# Patient Record
Sex: MALE | Race: WHITE
[De-identification: names, ages, dates, MRNs, and addresses within clinical notes are randomized per-mention and may not be internally consistent; named-entity substitution may affect disease eponyms.]

---

## 2018-02-28 ENCOUNTER — HOSPITAL ENCOUNTER (EMERGENCY)
Dept: HOSPITAL 80 - CED | Age: 43
Discharge: HOME | End: 2018-02-28
Payer: COMMERCIAL

## 2018-02-28 VITALS — DIASTOLIC BLOOD PRESSURE: 132 MMHG | HEART RATE: 92 BPM | RESPIRATION RATE: 20 BRPM | SYSTOLIC BLOOD PRESSURE: 171 MMHG

## 2018-02-28 VITALS — TEMPERATURE: 99 F | OXYGEN SATURATION: 95 %

## 2018-02-28 DIAGNOSIS — J06.9: Primary | ICD-10-CM

## 2018-02-28 NOTE — EDPHY
H & P


Stated Complaint: coughing/hives


Time Seen by Provider: 02/28/18 18:14


HPI/ROS: 





Chief Complaint:  Cough, fevers, chills





HPI:  42-year-old male presenting with 3 days of cough, sore throat, congestion

, fevers, chills and body aches.  Patient states earlier he had a coughing fit 

about a little bit of wheezing.  This lasted about 30 min.  Also developed some 

hives.  These have since resolved.  No nausea or vomiting.  No chest pain or 

shortness of breath.  No abdominal pain.  No diarrhea or constipation.





ROS:  10 point Review of Systems is negative except as noted in the HPI.





PMH:  Denies





Social History: No smoking





Family History: non-contributory





Physical Exam:


Gen: Awake, Alert, No Distress


HEENT:  


     Nose: no rhinorrhea


     Eyes: PERRLA, EOMI


     Mouth: Moist mucosa mild pharyngeal erythema without edema or exudate


Neck: Supple, no JVD


Chest: nontender, lungs clear to auscultation


Heart: S1, S2 normal, no murmur


Abd: Soft, non-tender, no guarding


Back: no CVA tenderness, no midline tenderness 


Ext: no edema, non-tender


Skin: no rash


Neuro: CN II-XII intact, Sensation grossly intact, Strength 5/5 in bilateral 

upper and lower extremities








- Personal History


Current Tetanus/Diphtheria Vaccine: Yes


Current Tetanus Diphtheria and Acellular Pertussis (TDAP): Yes





- Medical/Surgical History


Hx Asthma: No


Hx Chronic Respiratory Disease: No


Hx Diabetes: No


Hx Cardiac Disease: No


Hx Renal Disease: No


Hx Cirrhosis: No


Hx Alcoholism: No


Hx HIV/AIDS: No


Hx Splenectomy or Spleen Trauma: No


Other PMH: high cholesterol, tonsillectomy, appendectomy





- Social History


Smoking Status: Never smoked


Constitutional: 


 Initial Vital Signs











Temperature (C)  37.2 C   02/28/18 18:09


 


Heart Rate  91   02/28/18 18:09


 


Respiratory Rate  22 H  02/28/18 18:09


 


Blood Pressure  163/115 H  02/28/18 18:09


 


O2 Sat (%)  95   02/28/18 18:09








 











O2 Delivery Mode               Room Air














Allergies/Adverse Reactions: 


 





ciprofloxacin [From Cipro] Allergy (Verified 02/28/18 18:13)


 


levofloxacin [From Levaquin] Allergy (Verified 02/28/18 18:13)


 








Home Medications: 














 Medication  Instructions  Recorded


 


Nexium    09/14/13


 


Vytorin    09/14/13


 


Esomeprazole Mag Trihydrate 40 mg PO BID #30 cap.dr 05/22/14





[Nexium]  


 


Hydrocodone/APAP 5/325 [Norco 1 - 2 tab PO Q4PRN PRN #15 tab 05/22/14





5/325 (*)]  


 


Sucralfate [Carafate] 1 gm PO QID #50 tab 05/22/14


 


ZYRTEC  05/22/14


 


Albuterol [Proventil Inhaler HFA 1 - 2 puffs IH Q4H PRN #1 mdi 02/28/18





(*)]  














Medical Decision Making


ED Course/Re-evaluation: 





Patient with viral URI symptoms.  No focal bacterial infection.  Patient is 

declining flu test at this time as he would not want to take Tamiflu evening 

were positive.  He would not be a candidate for Tamiflu as he does not meet CDC 

guidelines and he is over 2 days of symptoms.  Will discharge with follow-up 

with primary care physician, return for worsening.  He does occasionally get 

wheezy with URI symptoms.  Will prescribe him a MDI if he needs it.





Departure





- Departure


Disposition: Home, Routine, Self-Care


Clinical Impression: 


 URI (upper respiratory infection)





Condition: Good


Instructions:  Upper Respiratory Infection (ED)


Additional Instructions: 


You may use the albuterol inhaler 2 puffs every 4 hr as needed for wheeze or 

cough.


Take Tylenol, 1000 mg every 6 hr for fever.


Drink plenty of fluids.


Return to the emergency department for increasing cough, shortness of breath, 

uncontrolled fevers, vomiting, or any other concerns.


Referrals: 


Anabelle Wallace MD [Medical Doctor] - As per Instructions


Prescriptions: 


Albuterol [Proventil Inhaler HFA (*)] 1 - 2 puffs IH Q4H PRN #1 mdi


 PRN Reason: Wheezing